# Patient Record
(demographics unavailable — no encounter records)

---

## 2024-10-23 NOTE — DISCUSSION/SUMMARY
[de-identified] : Lumbar degenerative disc disease C6-7 Mild degenerative disc disease  Discussed all options. Diagnosed with neuropathy by neurologist  Referral for physical therapy. Diclofenac PRN  If no better will obtain MRI  All options discussed including rest, medicine, home exercise, acupuncture, Chiropractic care, Physical Therapy, Pain management, and last resort surgery. All questions were answered, all alternatives discussed and the patient is in complete agreement with the treatment plan which the patient contributed to and discussed with me through the shared decision-making process. Follow-up appointment as instructed. Any issues and the patient will call or come in sooner. Follow-up appointment as instructed. Any issues and the patient will call or come in sooner.

## 2024-10-23 NOTE — PHYSICAL EXAM
[Normal] : Gait: normal [Lockhart's Sign] : negative Lockhart's sign [Pronator Drift] : negative pronator drift [SLR] : negative straight leg raise [de-identified] : 5 out of 5 motor strength, sensation is intact and symmetrical full range of motion flexion extension and rotation, no palpatory tenderness full range of motion of hips knees shoulders and elbows (all four extremities), no atrophy, negative straight leg raise, no pathological reflexes, no swelling, normal ambulation, no apparent distress skin is intact, no palpable lymph nodes, no upper or lower extremity instability, alert and oriented x3 and normal mood. Normal finger-to nose test. [de-identified] : XR AP Lat Cervical 10/23/2024 -C6-7 Mild degenerative disc disease -reviewed with patient.    XR AP Lat Lumbar 10/23/2024-Moderaste Lumbar degenerative disc disease-reviewed with patient.

## 2024-10-23 NOTE — ADDENDUM
[FreeTextEntry1] : This note was written by Romana Desir on 10/23/2024 acting as scribe for Dr. Delfino Salas M.D.  I, Delfino Salas MD, have read and attest that all the information, medical decision making and discharge instructions within are true and accurate.